# Patient Record
Sex: MALE | Race: WHITE | Employment: OTHER | URBAN - NONMETROPOLITAN AREA
[De-identification: names, ages, dates, MRNs, and addresses within clinical notes are randomized per-mention and may not be internally consistent; named-entity substitution may affect disease eponyms.]

---

## 2017-08-11 ENCOUNTER — INITIAL CONSULT (OUTPATIENT)
Dept: PULMONOLOGY | Age: 42
End: 2017-08-11
Payer: COMMERCIAL

## 2017-08-11 VITALS
WEIGHT: 178 LBS | HEIGHT: 70 IN | DIASTOLIC BLOOD PRESSURE: 70 MMHG | SYSTOLIC BLOOD PRESSURE: 120 MMHG | HEART RATE: 70 BPM | BODY MASS INDEX: 25.48 KG/M2

## 2017-08-11 VITALS
WEIGHT: 173 LBS | OXYGEN SATURATION: 98 % | BODY MASS INDEX: 24.77 KG/M2 | HEIGHT: 70 IN | DIASTOLIC BLOOD PRESSURE: 82 MMHG | SYSTOLIC BLOOD PRESSURE: 115 MMHG | HEART RATE: 60 BPM

## 2017-08-11 DIAGNOSIS — G47.00 DIFFICULTY STAYING ASLEEP: ICD-10-CM

## 2017-08-11 DIAGNOSIS — R06.81 WITNESSED APNEIC SPELLS: ICD-10-CM

## 2017-08-11 DIAGNOSIS — G47.10 HYPERSOMNIA: Primary | ICD-10-CM

## 2017-08-11 DIAGNOSIS — R06.83 SNORING: ICD-10-CM

## 2017-08-11 PROBLEM — R53.83 FATIGUE: Status: ACTIVE | Noted: 2017-08-11

## 2017-08-11 PROCEDURE — G8419 CALC BMI OUT NRM PARAM NOF/U: HCPCS | Performed by: INTERNAL MEDICINE

## 2017-08-11 PROCEDURE — 99203 OFFICE O/P NEW LOW 30 MIN: CPT | Performed by: INTERNAL MEDICINE

## 2017-08-11 PROCEDURE — 1036F TOBACCO NON-USER: CPT | Performed by: INTERNAL MEDICINE

## 2017-08-11 PROCEDURE — G8428 CUR MEDS NOT DOCUMENT: HCPCS | Performed by: INTERNAL MEDICINE

## 2017-08-15 ENCOUNTER — TELEPHONE (OUTPATIENT)
Dept: PULMONOLOGY | Age: 42
End: 2017-08-15

## 2017-08-16 ENCOUNTER — HOSPITAL ENCOUNTER (OUTPATIENT)
Dept: SLEEP CENTER | Age: 42
Discharge: HOME | End: 2017-08-16
Payer: COMMERCIAL

## 2017-08-16 DIAGNOSIS — G47.10 HYPERSOMNIA: ICD-10-CM

## 2017-08-16 DIAGNOSIS — R06.83 SNORING: ICD-10-CM

## 2017-08-16 PROCEDURE — 95810 POLYSOM 6/> YRS 4/> PARAM: CPT

## 2017-08-17 LAB — STATUS: NORMAL

## 2017-08-22 ENCOUNTER — TELEPHONE (OUTPATIENT)
Dept: SLEEP CENTER | Age: 42
End: 2017-08-22

## 2017-08-22 DIAGNOSIS — G47.33 OSA (OBSTRUCTIVE SLEEP APNEA): Primary | ICD-10-CM

## 2017-08-23 ENCOUNTER — HOSPITAL ENCOUNTER (OUTPATIENT)
Dept: SLEEP CENTER | Age: 42
Discharge: HOME | End: 2017-08-23
Payer: COMMERCIAL

## 2017-08-23 DIAGNOSIS — G47.33 OSA (OBSTRUCTIVE SLEEP APNEA): ICD-10-CM

## 2017-08-23 PROCEDURE — 95811 POLYSOM 6/>YRS CPAP 4/> PARM: CPT

## 2017-08-24 ENCOUNTER — TELEPHONE (OUTPATIENT)
Dept: SLEEP CENTER | Age: 42
End: 2017-08-24

## 2017-08-24 DIAGNOSIS — G47.33 OSA (OBSTRUCTIVE SLEEP APNEA): Primary | ICD-10-CM

## 2017-08-24 LAB — STATUS: NORMAL

## 2017-08-25 ENCOUNTER — TELEPHONE (OUTPATIENT)
Dept: SLEEP CENTER | Age: 42
End: 2017-08-25

## 2017-10-13 ENCOUNTER — OFFICE VISIT (OUTPATIENT)
Dept: PULMONOLOGY | Age: 42
End: 2017-10-13
Payer: COMMERCIAL

## 2017-10-13 VITALS
SYSTOLIC BLOOD PRESSURE: 118 MMHG | DIASTOLIC BLOOD PRESSURE: 80 MMHG | BODY MASS INDEX: 26.74 KG/M2 | HEART RATE: 69 BPM | WEIGHT: 186.8 LBS | OXYGEN SATURATION: 97 % | HEIGHT: 70 IN

## 2017-10-13 DIAGNOSIS — Z99.89 OSA ON CPAP: ICD-10-CM

## 2017-10-13 DIAGNOSIS — G47.33 OSA ON CPAP: ICD-10-CM

## 2017-10-13 PROBLEM — R06.83 SNORING: Status: RESOLVED | Noted: 2017-08-11 | Resolved: 2017-10-13

## 2017-10-13 PROBLEM — R06.81 WITNESSED APNEIC SPELLS: Status: RESOLVED | Noted: 2017-08-11 | Resolved: 2017-10-13

## 2017-10-13 PROBLEM — G47.00 DIFFICULTY STAYING ASLEEP: Status: RESOLVED | Noted: 2017-08-11 | Resolved: 2017-10-13

## 2017-10-13 PROCEDURE — G8484 FLU IMMUNIZE NO ADMIN: HCPCS | Performed by: PHYSICIAN ASSISTANT

## 2017-10-13 PROCEDURE — 1036F TOBACCO NON-USER: CPT | Performed by: PHYSICIAN ASSISTANT

## 2017-10-13 PROCEDURE — 99213 OFFICE O/P EST LOW 20 MIN: CPT | Performed by: PHYSICIAN ASSISTANT

## 2017-10-13 PROCEDURE — G8427 DOCREV CUR MEDS BY ELIG CLIN: HCPCS | Performed by: PHYSICIAN ASSISTANT

## 2017-10-13 PROCEDURE — G8419 CALC BMI OUT NRM PARAM NOF/U: HCPCS | Performed by: PHYSICIAN ASSISTANT

## 2017-10-13 NOTE — PROGRESS NOTES
mouth      NONFORMULARY Indications: Prostix      NONFORMULARY Indications: Alleplex 1-2      NONFORMULARY Indications: Juice PLus      NONFORMULARY Indications: Congaplex and Emphaplex as needed      Fexofenadine-Pseudoephedrine (ALLEGRA-D PO) Take  by mouth daily.  NONFORMULARY Indications: Cataplex G       No current facility-administered medications for this visit. Family History   Problem Relation Age of Onset    Other Father      BHARATI        Review of Systems -   General ROS: gained 8 lbs over 3 month  ENT ROS: negative for - nasal congestion, oral lesions or sore throat  Hematological and Lymphatic ROS: negative  Endocrine ROS: negative  Respiratory ROS: no cough, shortness of breath, or wheezing  Cardiovascular ROS: no chest pain or dyspnea on exertion  Gastrointestinal ROS: no abdominal pain, change in bowel habits, or black or bloody stools  Musculoskeletal ROS: negative  Neurological ROS: negative    Physical Exam:    BMI:  Body mass index is 26.8 kg/m². Wt Readings from Last 3 Encounters:   10/13/17 186 lb 12.8 oz (84.7 kg)   08/11/17 173 lb (78.5 kg)   08/11/17 178 lb (80.7 kg)     Vitals: /80   Pulse 69   Ht 5' 10\" (1.778 m)   Wt 186 lb 12.8 oz (84.7 kg)   SpO2 97% Comment: room air at rest  BMI 26.80 kg/m²       General appearance: alert and oriented to person, place and time, well-developed and well-nourished, in no acute distress  Nose: Nares normal. Septum midline. Mucosa normal. No drainage or sinus tenderness. Oropharynx:  negative  Lungs: clear to auscultation bilaterally  Heart: regular rate and rhythm, S1, S2 normal, no murmur, click, rub or gallop  Extremities: extremities normal, atraumatic, no cyanosis or edema  Neurologic: Mental status: Alert, oriented, thought content appropriate      ASSESSMENT/DIAGNOSIS    1. BHARATI on CPAP              Plan   Do you need any equipment today?  No    - He  was advised to continue current positive airway pressure therapy

## 2018-04-03 ENCOUNTER — OFFICE VISIT (OUTPATIENT)
Dept: PULMONOLOGY | Age: 43
End: 2018-04-03
Payer: COMMERCIAL

## 2018-04-03 VITALS
HEIGHT: 70 IN | HEART RATE: 66 BPM | DIASTOLIC BLOOD PRESSURE: 68 MMHG | WEIGHT: 179 LBS | BODY MASS INDEX: 25.62 KG/M2 | SYSTOLIC BLOOD PRESSURE: 114 MMHG | OXYGEN SATURATION: 98 %

## 2018-04-03 DIAGNOSIS — Z99.89 OSA ON CPAP: Primary | ICD-10-CM

## 2018-04-03 DIAGNOSIS — G47.33 OSA ON CPAP: Primary | ICD-10-CM

## 2018-04-03 PROCEDURE — G8419 CALC BMI OUT NRM PARAM NOF/U: HCPCS | Performed by: PHYSICIAN ASSISTANT

## 2018-04-03 PROCEDURE — G8427 DOCREV CUR MEDS BY ELIG CLIN: HCPCS | Performed by: PHYSICIAN ASSISTANT

## 2018-04-03 PROCEDURE — 1036F TOBACCO NON-USER: CPT | Performed by: PHYSICIAN ASSISTANT

## 2018-04-03 PROCEDURE — 99213 OFFICE O/P EST LOW 20 MIN: CPT | Performed by: PHYSICIAN ASSISTANT

## 2018-04-03 ASSESSMENT — ENCOUNTER SYMPTOMS
WHEEZING: 0
GASTROINTESTINAL NEGATIVE: 1
COUGH: 0
SINUS PAIN: 0
HEARTBURN: 0
NAUSEA: 0
EYES NEGATIVE: 1
SORE THROAT: 0
ORTHOPNEA: 0
RESPIRATORY NEGATIVE: 1
SHORTNESS OF BREATH: 0
SPUTUM PRODUCTION: 0

## 2018-05-17 ENCOUNTER — TELEPHONE (OUTPATIENT)
Dept: PULMONOLOGY | Age: 43
End: 2018-05-17

## 2018-05-17 DIAGNOSIS — G47.33 OSA ON CPAP: Primary | ICD-10-CM

## 2018-05-17 DIAGNOSIS — Z99.89 OSA ON CPAP: Primary | ICD-10-CM

## 2019-06-21 ENCOUNTER — OFFICE VISIT (OUTPATIENT)
Dept: PULMONOLOGY | Age: 44
End: 2019-06-21
Payer: COMMERCIAL

## 2019-06-21 VITALS
BODY MASS INDEX: 24.48 KG/M2 | SYSTOLIC BLOOD PRESSURE: 130 MMHG | HEIGHT: 70 IN | WEIGHT: 171 LBS | DIASTOLIC BLOOD PRESSURE: 68 MMHG | HEART RATE: 66 BPM | OXYGEN SATURATION: 98 %

## 2019-06-21 DIAGNOSIS — J30.2 SEASONAL ALLERGIES: ICD-10-CM

## 2019-06-21 DIAGNOSIS — Z99.89 OSA ON CPAP: Primary | ICD-10-CM

## 2019-06-21 DIAGNOSIS — G47.33 OSA ON CPAP: Primary | ICD-10-CM

## 2019-06-21 PROCEDURE — 99214 OFFICE O/P EST MOD 30 MIN: CPT | Performed by: PHYSICIAN ASSISTANT

## 2019-06-21 PROCEDURE — 1036F TOBACCO NON-USER: CPT | Performed by: PHYSICIAN ASSISTANT

## 2019-06-21 PROCEDURE — G8427 DOCREV CUR MEDS BY ELIG CLIN: HCPCS | Performed by: PHYSICIAN ASSISTANT

## 2019-06-21 PROCEDURE — G8420 CALC BMI NORM PARAMETERS: HCPCS | Performed by: PHYSICIAN ASSISTANT

## 2019-06-21 RX ORDER — MONTELUKAST SODIUM 10 MG/1
10 TABLET ORAL NIGHTLY
Qty: 30 TABLET | Refills: 5 | Status: SHIPPED | OUTPATIENT
Start: 2019-06-21 | End: 2020-07-15 | Stop reason: SDUPTHER

## 2019-06-21 ASSESSMENT — ENCOUNTER SYMPTOMS
WHEEZING: 0
DIARRHEA: 0
CHEST TIGHTNESS: 0
RHINORRHEA: 1
STRIDOR: 0
COUGH: 0
SHORTNESS OF BREATH: 0
NAUSEA: 0
ALLERGIC/IMMUNOLOGIC NEGATIVE: 1
EYES NEGATIVE: 1
BACK PAIN: 0

## 2019-06-21 NOTE — PROGRESS NOTES
[facility]      Marimar Ireland         [id number]  6/21/2019   Chief Complaint   Patient presents with    Follow-up     BHARATI 1 year follow up with a download         Pt of Dr. Roni NÚÑEZ Download:   Original or initialAHI: 7.8     Date of initial study: 8/16/17      Compliant  23%     Noncompliant 43 %     PAP Type AutoSet Level  5/15 cmH20    Avg Hrs/Day 3:32  AHI: 1.3   Recorded compliance dates , 05/20/19  to 06/18/19   Machine/Mfg: ResMed   Interface: FFM     Provider:    __[facility]           [facility]        __ [facility]    __ [address]            __[facility] __Adaptive   __[place]:       _X_Other: [place]     Neck Size: 15.25  Mallampati Mallampati 2  ESS:  6  SAQLI: 81       Here is a scan of the most recent download:          Presentation:   Inés Perry presents for sleep medicine follow up for obstructive sleep apnea  Since the last visit, Inés Perry has been struggling with PAP. His allergies are limiting his usage. He has been on Singulair in the past with good benefit but could not afford and stopped taking it. Equipment issues: The pressure is  acceptable, the mask is acceptable and he  is  using the humidity. Sleep issues:  Do you feel better? No  More rested? No   Better concentration? no    Progress History:   Since last visit any new medical issues? Allergies  New ER or hospitlal visits? No  Any new or changes in medicines? No  Any new sleep medicines?  No        Past Medical History:   Diagnosis Date    Kidney stone     Sleep apnea        Past Surgical History:   Procedure Laterality Date    CHOLECYSTECTOMY      2000    LITHOTRIPSY  2012    VASECTOMY  2003       Social History     Tobacco Use    Smoking status: Never Smoker    Smokeless tobacco: Never Used   Substance Use Topics    Alcohol use: No    Drug use: No       Allergies   Allergen Reactions    Floxin [Ofloxacin]     Ultram [Tramadol Hcl]        Current Outpatient Medications Medication Sig Dispense Refill    NONFORMULARY Indications: Shawn       NONFORMULARY Indications: Cataplex       montelukast (SINGULAIR) 10 MG tablet Take 1 tablet by mouth nightly 30 tablet 5    CPAP Machine MISC by Does not apply route Please change CPAP pressure to auto 5 -15 cm H20. 1 each 0    NONFORMULARY Indications: Alleplex 1-2      NONFORMULARY Indications: Juice PLus       No current facility-administered medications for this visit. Family History   Problem Relation Age of Onset    Other Father         BHARATI        Review of Systems -   Review of Systems   Constitutional: Negative for activity change, appetite change, chills and fever. HENT: Positive for congestion, postnasal drip and rhinorrhea. Eyes: Negative. Respiratory: Negative for cough, chest tightness, shortness of breath, wheezing and stridor. Cardiovascular: Negative for chest pain and leg swelling. Gastrointestinal: Negative for diarrhea and nausea. Endocrine: Negative. Genitourinary: Negative. Musculoskeletal: Negative. Negative for arthralgias and back pain. Skin: Negative. Allergic/Immunologic: Negative. Neurological: Negative. Negative for dizziness and light-headedness. Psychiatric/Behavioral: Negative. All other systems reviewed and are negative. Physical Exam:    BMI:  Body mass index is 24.54 kg/m². Wt Readings from Last 3 Encounters:   06/21/19 171 lb (77.6 kg)   04/03/18 179 lb (81.2 kg)   10/13/17 186 lb 12.8 oz (84.7 kg)     Weight stable / unchanged  Vitals: /68 (Site: Left Upper Arm, Position: Sitting, Cuff Size: Large Adult)   Pulse 66   Ht 5' 10\" (1.778 m)   Wt 171 lb (77.6 kg)   SpO2 98% Comment: on RA  BMI 24.54 kg/m²       Physical Exam   Constitutional: He is oriented to person, place, and time. He appears well-developed and well-nourished. HENT:   Head: Normocephalic and atraumatic.    Right Ear: External ear normal.   Left Ear: External ear normal. Mouth/Throat: Oropharynx is clear and moist.   Eyes: Pupils are equal, round, and reactive to light. Conjunctivae and EOM are normal.   Neck: Normal range of motion. Neck supple. Cardiovascular: Normal rate, regular rhythm and normal heart sounds. Pulmonary/Chest: Effort normal and breath sounds normal.   Abdominal: Soft. Musculoskeletal: Normal range of motion. Neurological: He is alert and oriented to person, place, and time. Skin: Skin is warm and dry. Psychiatric: He has a normal mood and affect. His behavior is normal. Judgment and thought content normal.         ASSESSMENT/DIAGNOSIS     Diagnosis Orders   1. BHARATI on CPAP     2. Seasonal allergies              Plan   Do you need any equipment today? No  - will restart Singulair and use Good Rx card for savings  - Once allergies improved will start back on PAP  - He  was advised to continue current positive airway pressure therapy with above described pressure. - He  advised to keep goodcompliance with current recommended pressure to get optimal results and clinical improvement  - Recommend 7-9 hours of sleep with PAP  - He was advised to call [employer] regarding supplies if needed.   -He call my office for earlier appointment if needed for worsening of sleep symptoms.   - He was instructed on weight loss  - Herbert Kurtz was educated about my impression and plan. Patient verbalizesunderstanding.   We will see Antonio Werner back in: 3 months with download    Information added by my medical assistant/LPN was reviewed today         Josephine Congress JENNIFER, ANA PAULA  6/21/2019

## 2019-08-02 ENCOUNTER — OFFICE VISIT (OUTPATIENT)
Dept: PULMONOLOGY | Age: 44
End: 2019-08-02
Payer: COMMERCIAL

## 2019-08-02 VITALS
HEART RATE: 71 BPM | DIASTOLIC BLOOD PRESSURE: 78 MMHG | OXYGEN SATURATION: 99 % | SYSTOLIC BLOOD PRESSURE: 110 MMHG | RESPIRATION RATE: 16 BRPM | BODY MASS INDEX: 24.05 KG/M2 | HEIGHT: 70 IN | WEIGHT: 168 LBS

## 2019-08-02 DIAGNOSIS — J30.2 SEASONAL ALLERGIES: ICD-10-CM

## 2019-08-02 DIAGNOSIS — G47.33 OSA ON CPAP: Primary | ICD-10-CM

## 2019-08-02 DIAGNOSIS — Z99.89 OSA ON CPAP: Primary | ICD-10-CM

## 2019-08-02 PROCEDURE — G8427 DOCREV CUR MEDS BY ELIG CLIN: HCPCS | Performed by: PHYSICIAN ASSISTANT

## 2019-08-02 PROCEDURE — G8420 CALC BMI NORM PARAMETERS: HCPCS | Performed by: PHYSICIAN ASSISTANT

## 2019-08-02 PROCEDURE — 99213 OFFICE O/P EST LOW 20 MIN: CPT | Performed by: PHYSICIAN ASSISTANT

## 2019-08-02 PROCEDURE — 1036F TOBACCO NON-USER: CPT | Performed by: PHYSICIAN ASSISTANT

## 2019-08-02 ASSESSMENT — ENCOUNTER SYMPTOMS
CHEST TIGHTNESS: 0
SHORTNESS OF BREATH: 0
COUGH: 0
ALLERGIC/IMMUNOLOGIC NEGATIVE: 1
BACK PAIN: 0
NAUSEA: 0
DIARRHEA: 0
STRIDOR: 0
EYES NEGATIVE: 1
WHEEZING: 0

## 2019-08-02 NOTE — PROGRESS NOTES
NONFORMULARY Indications: Shawn       NONFORMULARY Indications: Cataplex       montelukast (SINGULAIR) 10 MG tablet Take 1 tablet by mouth nightly 30 tablet 5    CPAP Machine MISC by Does not apply route Please change CPAP pressure to auto 5 -15 cm H20. 1 each 0    NONFORMULARY Indications: Alleplex 1-2      NONFORMULARY Indications: Juice PLus       No current facility-administered medications for this visit. Family History   Problem Relation Age of Onset    Other Father         BHARATI        Review of Systems -   Review of Systems   Constitutional: Negative for activity change, appetite change, chills and fever. HENT: Negative for congestion and postnasal drip. Eyes: Negative. Respiratory: Negative for cough, chest tightness, shortness of breath, wheezing and stridor. Cardiovascular: Negative for chest pain and leg swelling. Gastrointestinal: Negative for diarrhea and nausea. Endocrine: Negative. Genitourinary: Negative. Musculoskeletal: Negative. Negative for arthralgias and back pain. Skin: Negative. Allergic/Immunologic: Negative. Neurological: Negative. Negative for dizziness and light-headedness. Psychiatric/Behavioral: Negative. All other systems reviewed and are negative. Physical Exam:    BMI:  Body mass index is 24.11 kg/m². Wt Readings from Last 3 Encounters:   08/02/19 168 lb (76.2 kg)   06/21/19 171 lb (77.6 kg)   04/03/18 179 lb (81.2 kg)     Weight lost 3 lbs over 2 months  Vitals: /78 (Site: Left Upper Arm, Position: Sitting, Cuff Size: Large Adult)   Pulse 71   Resp 16   Ht 5' 10\" (1.778 m)   Wt 168 lb (76.2 kg)   SpO2 99% Comment: on RA  BMI 24.11 kg/m²       Physical Exam   Constitutional: He is oriented to person, place, and time. He appears well-developed and well-nourished. HENT:   Head: Normocephalic and atraumatic.    Right Ear: External ear normal.   Left Ear: External ear normal.   Mouth/Throat: Oropharynx is clear and

## 2020-05-07 ENCOUNTER — HOSPITAL ENCOUNTER (EMERGENCY)
Age: 45
Discharge: HOME | End: 2020-05-07
Payer: COMMERCIAL

## 2020-05-07 VITALS
HEIGHT: 70 IN | BODY MASS INDEX: 25.77 KG/M2 | HEART RATE: 69 BPM | TEMPERATURE: 97.9 F | RESPIRATION RATE: 16 BRPM | OXYGEN SATURATION: 97 % | SYSTOLIC BLOOD PRESSURE: 125 MMHG | WEIGHT: 180 LBS | DIASTOLIC BLOOD PRESSURE: 76 MMHG

## 2020-05-07 PROCEDURE — 6360000002 HC RX W HCPCS: Performed by: NURSE PRACTITIONER

## 2020-05-07 PROCEDURE — 90715 TDAP VACCINE 7 YRS/> IM: CPT | Performed by: NURSE PRACTITIONER

## 2020-05-07 PROCEDURE — 99202 OFFICE O/P NEW SF 15 MIN: CPT | Performed by: NURSE PRACTITIONER

## 2020-05-07 PROCEDURE — 12001 RPR S/N/AX/GEN/TRNK 2.5CM/<: CPT | Performed by: NURSE PRACTITIONER

## 2020-05-07 PROCEDURE — 90471 IMMUNIZATION ADMIN: CPT | Performed by: NURSE PRACTITIONER

## 2020-05-07 PROCEDURE — 12001 RPR S/N/AX/GEN/TRNK 2.5CM/<: CPT

## 2020-05-07 PROCEDURE — 2500000003 HC RX 250 WO HCPCS

## 2020-05-07 PROCEDURE — 99212 OFFICE O/P EST SF 10 MIN: CPT

## 2020-05-07 PROCEDURE — 6370000000 HC RX 637 (ALT 250 FOR IP): Performed by: NURSE PRACTITIONER

## 2020-05-07 RX ORDER — LIDOCAINE HYDROCHLORIDE 10 MG/ML
INJECTION, SOLUTION EPIDURAL; INFILTRATION; INTRACAUDAL; PERINEURAL
Status: COMPLETED
Start: 2020-05-07 | End: 2020-05-07

## 2020-05-07 RX ORDER — ACETAMINOPHEN 650 MG
TABLET, EXTENDED RELEASE ORAL ONCE
Status: COMPLETED | OUTPATIENT
Start: 2020-05-07 | End: 2020-05-07

## 2020-05-07 RX ORDER — DIAPER,BRIEF,INFANT-TODD,DISP
EACH MISCELLANEOUS ONCE
Status: COMPLETED | OUTPATIENT
Start: 2020-05-07 | End: 2020-05-07

## 2020-05-07 RX ADMIN — LIDOCAINE HYDROCHLORIDE: 10 INJECTION, SOLUTION EPIDURAL; INFILTRATION; INTRACAUDAL; PERINEURAL at 11:57

## 2020-05-07 RX ADMIN — BACITRACIN ZINC: 500 OINTMENT TOPICAL at 11:57

## 2020-05-07 RX ADMIN — TETANUS TOXOID, REDUCED DIPHTHERIA TOXOID AND ACELLULAR PERTUSSIS VACCINE, ADSORBED 0.5 ML: 5; 2.5; 8; 8; 2.5 SUSPENSION INTRAMUSCULAR at 11:12

## 2020-05-07 RX ADMIN — Medication: at 11:13

## 2020-05-07 ASSESSMENT — ENCOUNTER SYMPTOMS
NAUSEA: 0
SHORTNESS OF BREATH: 0
VOMITING: 0
TROUBLE SWALLOWING: 0

## 2020-05-07 ASSESSMENT — PAIN DESCRIPTION - LOCATION: LOCATION: FINGER (COMMENT WHICH ONE)

## 2020-05-07 ASSESSMENT — PAIN DESCRIPTION - DESCRIPTORS: DESCRIPTORS: THROBBING

## 2020-05-07 ASSESSMENT — PAIN DESCRIPTION - FREQUENCY: FREQUENCY: CONTINUOUS

## 2020-05-07 ASSESSMENT — PAIN SCALES - GENERAL: PAINLEVEL_OUTOF10: 10

## 2020-05-07 ASSESSMENT — PAIN DESCRIPTION - PAIN TYPE: TYPE: ACUTE PAIN

## 2020-05-07 ASSESSMENT — PAIN DESCRIPTION - ORIENTATION: ORIENTATION: RIGHT

## 2020-05-07 NOTE — ED TRIAGE NOTES
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 Pt states pain 10. 1 cm laceration noted no active bleeding at medial side of distal knuckle.

## 2020-07-15 RX ORDER — MONTELUKAST SODIUM 10 MG/1
10 TABLET ORAL NIGHTLY
Qty: 30 TABLET | Refills: 5 | Status: SHIPPED | OUTPATIENT
Start: 2020-07-15 | End: 2022-04-01 | Stop reason: SDUPTHER

## 2020-07-15 NOTE — TELEPHONE ENCOUNTER
Lito Ferguson called requesting a refill on the following medications:  Requested Prescriptions     Pending Prescriptions Disp Refills    montelukast (SINGULAIR) 10 MG tablet 30 tablet 5     Sig: Take 1 tablet by mouth nightly     Pharmacy verified:[facility]    . pv      Date of last visit: 08/02/19  Date of next visit (if applicable): Visit date not found

## 2020-08-07 ENCOUNTER — OFFICE VISIT (OUTPATIENT)
Dept: PULMONOLOGY | Age: 45
End: 2020-08-07
Payer: COMMERCIAL

## 2020-08-07 VITALS
OXYGEN SATURATION: 98 % | SYSTOLIC BLOOD PRESSURE: 124 MMHG | HEIGHT: 70 IN | WEIGHT: 181 LBS | DIASTOLIC BLOOD PRESSURE: 78 MMHG | HEART RATE: 70 BPM | TEMPERATURE: 97.5 F | BODY MASS INDEX: 25.91 KG/M2

## 2020-08-07 PROCEDURE — 99214 OFFICE O/P EST MOD 30 MIN: CPT | Performed by: PHYSICIAN ASSISTANT

## 2020-08-07 ASSESSMENT — ENCOUNTER SYMPTOMS
NAUSEA: 0
WHEEZING: 0
EYES NEGATIVE: 1
ALLERGIC/IMMUNOLOGIC NEGATIVE: 1
STRIDOR: 0
CHEST TIGHTNESS: 0
DIARRHEA: 0
COUGH: 0
SHORTNESS OF BREATH: 0
BACK PAIN: 0

## 2020-08-07 NOTE — PROGRESS NOTES
[facility]      Chace Love         [id number]  8/7/2020   Chief Complaint   Patient presents with    Follow-up     BHARATI 1 year follow up with a download        Pt of Dr. Jamel Christina    PAP Download:   Original or initial AHI: 7.8     Date of initial study: 8/16/17      Compliant  77%     Noncompliant 10 %     PAP Type Auto Level  5/15 cmH20   Avg Hrs/Day 6:16  AHI: 1.0   Recorded compliance dates , 7/7/20  to 8/5/20   Machine/Mfg:   [x] ResMed    [] Respironics/Dreamstation   Interface:   [] Nasal    [] Nasal pillows   [x] FFM      Provider:      [] -CAYETANO     []Milla     [] Sapna    [] Michelle Dewitt    [x] Santa               [] [facility]      [] [facility]    [] [address]      [] Other    Neck Size: 15.25  Mallampati Mallampati 2  ESS:  7  SAQLI:     Here is a scan of the most recent download:          Presentation:   Pallavi Preston presents for sleep medicine follow up for obstructive sleep apnea  Since the last visit, Pallavi Preston is doing well with PAP. He still feels tired. He has noticed this worse the past few months. He wakes 1-2 times a nights and will fall back asleep. He has been following with a chiropractor and is on supplements and was started on supplement for fatigue. Equipment issues: The pressure is  acceptable, the mask is acceptable     Sleep issues:  Do you feel better? Yes and No  More rested? Sometimes   Better concentration? yes    Progress History:   Since last visit any new medical issues? No  New ER or hospitlal visits? No  Any new or changes in medicines? No  Any new sleep medicines? No        Past Medical History:   Diagnosis Date    Kidney stone     Sleep apnea        Past Surgical History:   Procedure Laterality Date    CHOLECYSTECTOMY      2000    LITHOTRIPSY  2012    VASECTOMY  2003       Social History     Tobacco Use    Smoking status: Never Smoker    Smokeless tobacco: Never Used   Substance Use Topics    Alcohol use:  No  Drug use: No       Allergies   Allergen Reactions    Floxin [Ofloxacin]     Ultram [Tramadol Hcl]        Current Outpatient Medications   Medication Sig Dispense Refill    montelukast (SINGULAIR) 10 MG tablet Take 1 tablet by mouth nightly 30 tablet 5    CPAP Machine MISC by Does not apply route Please change CPAP pressure to auto 5 -15 cm H20. 1 each 0    NONFORMULARY Indications: Shawn       NONFORMULARY Indications: Cataplex       NONFORMULARY Indications: Alleplex 1-2      NONFORMULARY Indications: Juice PLus       No current facility-administered medications for this visit. Family History   Problem Relation Age of Onset    Other Father         BHARATI        Review of Systems -   Review of Systems   Constitutional: Negative for activity change, appetite change, chills and fever. HENT: Negative for congestion and postnasal drip. Eyes: Negative. Respiratory: Negative for cough, chest tightness, shortness of breath, wheezing and stridor. Cardiovascular: Negative for chest pain and leg swelling. Gastrointestinal: Negative for diarrhea and nausea. Endocrine: Negative. Genitourinary: Negative. Musculoskeletal: Negative. Negative for arthralgias and back pain. Skin: Negative. Allergic/Immunologic: Negative. Neurological: Negative. Negative for dizziness and light-headedness. Psychiatric/Behavioral: Negative. All other systems reviewed and are negative. Physical Exam:    BMI:  Body mass index is 25.97 kg/m².     Wt Readings from Last 3 Encounters:   08/07/20 181 lb (82.1 kg)   05/07/20 180 lb (81.6 kg)   08/02/19 168 lb (76.2 kg)     Weight gained 13 lbs over 1 year  Vitals: /78 (Site: Left Upper Arm, Position: Sitting, Cuff Size: Medium Adult)   Pulse 70   Temp 97.5 °F (36.4 °C) (Tympanic)   Ht 5' 10\" (1.778 m)   Wt 181 lb (82.1 kg)   SpO2 98% Comment: on room air at rest  BMI 25.97 kg/m²       Physical Exam  Constitutional:       Appearance: He is well-developed. HENT:      Head: Normocephalic and atraumatic. Right Ear: External ear normal.      Left Ear: External ear normal.   Eyes:      Conjunctiva/sclera: Conjunctivae normal.      Pupils: Pupils are equal, round, and reactive to light. Neck:      Musculoskeletal: Normal range of motion and neck supple. Cardiovascular:      Rate and Rhythm: Normal rate and regular rhythm. Heart sounds: Normal heart sounds. Pulmonary:      Effort: Pulmonary effort is normal.      Breath sounds: Normal breath sounds. Musculoskeletal: Normal range of motion. Skin:     General: Skin is warm and dry. Neurological:      Mental Status: He is alert and oriented to person, place, and time. Psychiatric:         Behavior: Behavior normal.         Thought Content: Thought content normal.         Judgment: Judgment normal.           ASSESSMENT/DIAGNOSIS     Diagnosis Orders   1. BHARATI on CPAP     2. Hypersomnia              Plan   Do you need any equipment today? Yes update supplies  - Will change pressure to 10-20 to see if improves him taking his PAP off. - will wait and see if supplement improves hypersomnia  - He  was advised to continue current positive airway pressure therapy with above described pressure. - He  advised to keep good compliance with current recommended pressure to get optimal results and clinical improvement  - Recommend 7-9 hours of sleep with PAP  - He was advised to call [employer] regarding supplies if needed.   -He call my office for earlier appointment if needed for worsening of sleep symptoms.   - He was instructed on weight loss  - Media Ohms was educated about my impression and plan. Patient verbalizesunderstanding.   We will see Vanessa Ulises back in: 3 months with download    Information added by my medical assistant/LPN was reviewed today         Jeff De Los Santos PA-C, MPAS  8/7/2020

## 2020-08-27 ENCOUNTER — TELEPHONE (OUTPATIENT)
Dept: PULMONOLOGY | Age: 45
End: 2020-08-27

## 2020-08-27 NOTE — TELEPHONE ENCOUNTER
Patient phoned in and reported at his last visit he had pressure change, and reported that he is sleeping better and feeling better. Patient reports though that his wife told him he is constantly tossing and turning and kicking legs through night. Patient questioning if you have any suggestions for him. Please advise.

## 2020-12-04 ENCOUNTER — OFFICE VISIT (OUTPATIENT)
Dept: PULMONOLOGY | Age: 45
End: 2020-12-04
Payer: COMMERCIAL

## 2020-12-04 VITALS
SYSTOLIC BLOOD PRESSURE: 120 MMHG | DIASTOLIC BLOOD PRESSURE: 68 MMHG | BODY MASS INDEX: 25.86 KG/M2 | HEIGHT: 70 IN | HEART RATE: 69 BPM | WEIGHT: 180.6 LBS | OXYGEN SATURATION: 98 %

## 2020-12-04 PROCEDURE — 99213 OFFICE O/P EST LOW 20 MIN: CPT | Performed by: PHYSICIAN ASSISTANT

## 2020-12-04 ASSESSMENT — ENCOUNTER SYMPTOMS
EYES NEGATIVE: 1
COUGH: 0
NAUSEA: 0
SHORTNESS OF BREATH: 0
DIARRHEA: 0
STRIDOR: 0
WHEEZING: 0
ALLERGIC/IMMUNOLOGIC NEGATIVE: 1
BACK PAIN: 0
CHEST TIGHTNESS: 0

## 2020-12-04 NOTE — PROGRESS NOTES
Allergies   Allergen Reactions    Floxin [Ofloxacin]     Ultram [Tramadol Hcl]        Current Outpatient Medications   Medication Sig Dispense Refill    CPAP Machine MISC by Does not apply route Please change CPAP pressure to auto 10-20 cm H20. 1 each 0    montelukast (SINGULAIR) 10 MG tablet Take 1 tablet by mouth nightly 30 tablet 5    NONFORMULARY Indications: Shawn       NONFORMULARY Indications: Cataplex       NONFORMULARY Indications: Alleplex 1-2      NONFORMULARY Indications: Juice PLus       No current facility-administered medications for this visit. Family History   Problem Relation Age of Onset    Other Father         BHARATI        Review of Systems -   Review of Systems   Constitutional: Negative for activity change, appetite change, chills and fever. HENT: Negative for congestion and postnasal drip. Eyes: Negative. Respiratory: Negative for cough, chest tightness, shortness of breath, wheezing and stridor. Cardiovascular: Negative for chest pain and leg swelling. Gastrointestinal: Negative for diarrhea and nausea. Bloating   Endocrine: Negative. Genitourinary: Negative. Musculoskeletal: Negative. Negative for arthralgias and back pain. Skin: Negative. Allergic/Immunologic: Negative. Neurological: Negative. Negative for dizziness and light-headedness. Psychiatric/Behavioral: Negative. All other systems reviewed and are negative. Physical Exam:    BMI:  Body mass index is 25.91 kg/m². Wt Readings from Last 3 Encounters:   12/04/20 180 lb 9.6 oz (81.9 kg)   08/07/20 181 lb (82.1 kg)   05/07/20 180 lb (81.6 kg)     Weight stable / unchanged  Vitals: /68 (Site: Left Upper Arm, Position: Sitting, Cuff Size: Large Adult)   Pulse 69   Ht 5' 10\" (1.778 m)   Wt 180 lb 9.6 oz (81.9 kg)   SpO2 98% Comment: on room air at rest  BMI 25.91 kg/m²       Physical Exam  Constitutional:       Appearance: He is well-developed.    HENT: Head: Normocephalic and atraumatic. Right Ear: External ear normal.      Left Ear: External ear normal.   Eyes:      Conjunctiva/sclera: Conjunctivae normal.      Pupils: Pupils are equal, round, and reactive to light. Neck:      Musculoskeletal: Normal range of motion and neck supple. Cardiovascular:      Rate and Rhythm: Normal rate and regular rhythm. Heart sounds: Normal heart sounds. Pulmonary:      Effort: Pulmonary effort is normal.      Breath sounds: Normal breath sounds. Musculoskeletal: Normal range of motion. Skin:     General: Skin is warm and dry. Neurological:      Mental Status: He is alert and oriented to person, place, and time. Psychiatric:         Behavior: Behavior normal.         Thought Content: Thought content normal.         Judgment: Judgment normal.           ASSESSMENT/DIAGNOSIS     Diagnosis Orders   1. BHARATI on CPAP     2. Hypersomnia              Plan   Do you need any equipment today? No  - Will add EPR and set pressure to improve bloating  - Download reviewed and discussed with patient  - He  was advised to continue current positive airway pressure therapy with above described pressure. - He  advised to keep good compliance with current recommended pressure to get optimal results and clinical improvement  - Recommend 7-9 hours of sleep with PAP  - He was advised to call [employer] regarding supplies if needed.   -He call my office for earlier appointment if needed for worsening of sleep symptoms.   - He was instructed on weight loss  - Barbara Rdz was educated about my impression and plan. Patient verbalizesunderstanding.   We will see Sanjana Levy back in: 1 year with download    Information added by my medical assistant/LPN was reviewed today          Hadley Mccord PA-C, MPAS  12/4/2020

## 2021-11-16 ENCOUNTER — HOSPITAL ENCOUNTER (EMERGENCY)
Age: 46
Discharge: HOME | End: 2021-11-16
Payer: COMMERCIAL

## 2021-11-16 ENCOUNTER — APPOINTMENT (OUTPATIENT)
Dept: GENERAL RADIOLOGY | Age: 46
End: 2021-11-16
Payer: COMMERCIAL

## 2021-11-16 VITALS
RESPIRATION RATE: 20 BRPM | SYSTOLIC BLOOD PRESSURE: 130 MMHG | BODY MASS INDEX: 25.77 KG/M2 | HEART RATE: 88 BPM | TEMPERATURE: 97.4 F | OXYGEN SATURATION: 98 % | WEIGHT: 180 LBS | HEIGHT: 70 IN | DIASTOLIC BLOOD PRESSURE: 89 MMHG

## 2021-11-16 DIAGNOSIS — J06.9 UPPER RESPIRATORY TRACT INFECTION, UNSPECIFIED TYPE: Primary | ICD-10-CM

## 2021-11-16 DIAGNOSIS — Z20.822 ENCOUNTER FOR LABORATORY TESTING FOR COVID-19 VIRUS: ICD-10-CM

## 2021-11-16 PROCEDURE — 71046 X-RAY EXAM CHEST 2 VIEWS: CPT

## 2021-11-16 PROCEDURE — 99213 OFFICE O/P EST LOW 20 MIN: CPT | Performed by: NURSE PRACTITIONER

## 2021-11-16 PROCEDURE — 87636 SARSCOV2 & INF A&B AMP PRB: CPT

## 2021-11-16 PROCEDURE — 99213 OFFICE O/P EST LOW 20 MIN: CPT

## 2021-11-16 RX ORDER — ALBUTEROL SULFATE 90 UG/1
2 AEROSOL, METERED RESPIRATORY (INHALATION) EVERY 4 HOURS PRN
Qty: 18 G | Refills: 0 | Status: SHIPPED | OUTPATIENT
Start: 2021-11-16 | End: 2022-08-01

## 2021-11-16 RX ORDER — ACETAMINOPHEN 500 MG
500 TABLET ORAL EVERY 6 HOURS PRN
COMMUNITY

## 2021-11-16 RX ORDER — AZITHROMYCIN 250 MG/1
TABLET, FILM COATED ORAL
Qty: 1 PACKET | Refills: 0 | Status: SHIPPED | OUTPATIENT
Start: 2021-11-16 | End: 2022-08-01

## 2021-11-16 RX ORDER — DEXTROMETHORPHAN HYDROBROMIDE AND PROMETHAZINE HYDROCHLORIDE 15; 6.25 MG/5ML; MG/5ML
5 SYRUP ORAL 4 TIMES DAILY PRN
Qty: 118 ML | Refills: 0 | Status: SHIPPED | OUTPATIENT
Start: 2021-11-16 | End: 2021-11-23

## 2021-11-16 RX ORDER — BENZONATATE 200 MG/1
200 CAPSULE ORAL 3 TIMES DAILY PRN
Qty: 21 CAPSULE | Refills: 0 | Status: SHIPPED | OUTPATIENT
Start: 2021-11-16 | End: 2021-11-23

## 2021-11-16 RX ORDER — DEXAMETHASONE 6 MG/1
6 TABLET ORAL 2 TIMES DAILY WITH MEALS
Qty: 14 TABLET | Refills: 0 | Status: SHIPPED | OUTPATIENT
Start: 2021-11-16 | End: 2021-11-23

## 2021-11-16 ASSESSMENT — ENCOUNTER SYMPTOMS
NAUSEA: 0
COUGH: 1
CHEST TIGHTNESS: 1
DIARRHEA: 0
SHORTNESS OF BREATH: 0
SORE THROAT: 1
VOMITING: 0
SINUS PRESSURE: 1

## 2021-11-16 ASSESSMENT — PAIN DESCRIPTION - PAIN TYPE: TYPE: ACUTE PAIN

## 2021-11-16 ASSESSMENT — PAIN SCALES - GENERAL: PAINLEVEL_OUTOF10: 6

## 2021-11-16 ASSESSMENT — PAIN DESCRIPTION - FREQUENCY: FREQUENCY: CONTINUOUS

## 2021-11-16 ASSESSMENT — PAIN DESCRIPTION - LOCATION: LOCATION: HEAD

## 2021-11-16 ASSESSMENT — PAIN DESCRIPTION - PROGRESSION: CLINICAL_PROGRESSION: GRADUALLY WORSENING

## 2021-11-16 ASSESSMENT — PAIN DESCRIPTION - ONSET: ONSET: ON-GOING

## 2021-11-16 ASSESSMENT — PAIN DESCRIPTION - DESCRIPTORS: DESCRIPTORS: ACHING

## 2021-11-16 NOTE — ED PROVIDER NOTES
[address]  Urgent Care Encounter       CHIEF COMPLAINT       Chief Complaint   Patient presents with    Chest Congestion    Cough    Headache       Nurses Notes reviewed and I agree except as noted in the HPI. HISTORY OF PRESENT ILLNESS   Gissell Covington is a 39 y.o. male who presents for evaluation of cough, congestion, headaches, body aches, chills and mild chest tightness with fatigue that have been ongoing for the past 3 days. Patient states that the symptoms do seem to be worsening. He states that he took an at home Covid test from [employer] [facility] which was negative. Patient states that he has been taking Mucinex DM at home which provides minimal relief. The history is provided by the patient. REVIEW OF SYSTEMS     Review of Systems   Constitutional: Positive for chills. Negative for fever. HENT: Positive for congestion, postnasal drip, sinus pressure and sore throat. Respiratory: Positive for cough and chest tightness. Negative for shortness of breath. Cardiovascular: Negative for chest pain. Gastrointestinal: Negative for diarrhea, nausea and vomiting. Musculoskeletal: Positive for myalgias. Negative for arthralgias. Skin: Negative for rash. Allergic/Immunologic: Negative for immunocompromised state. Neurological: Positive for headaches. PAST MEDICAL HISTORY         Diagnosis Date    Kidney stone     Sleep apnea        SURGICALHISTORY     Patient  has a past surgical history that includes Lithotripsy (2012); Cholecystectomy; and Vasectomy (2003). CURRENT MEDICATIONS       Previous Medications    ACETAMINOPHEN (TYLENOL) 500 MG TABLET    Take 500 mg by mouth every 6 hours as needed for Pain    CPAP MACHINE MISC    by Does not apply route Please change CPAP pressure to 11 cm H20.   Add EPR 3    MONTELUKAST (SINGULAIR) 10 MG TABLET    Take 1 tablet by mouth nightly    NONFORMULARY    Indications: Alleplex 1-2    NONFORMULARY    Indications: Juice PLus       ALLERGIES     Patient is is allergic to floxin [ofloxacin] and ultram [tramadol hcl]. Patients   Immunization History   Administered Date(s) Administered    Tdap (Boostrix, Adacel) 05/07/2020       FAMILY HISTORY     Patient's family history includes Other in his father. SOCIAL HISTORY     Patient  reports that he has never smoked. He has never used smokeless tobacco. He reports that he does not drink alcohol and does not use drugs. PHYSICAL EXAM     ED TRIAGE VITALS  BP: 130/89, Temp: 97.4 °F (36.3 °C), Pulse: 88, Resp: 20, SpO2: 98 %,Estimated body mass index is 25.83 kg/m² as calculated from the following:    Height as of this encounter: 5' 10\" (1.778 m). Weight as of this encounter: 180 lb (81.6 kg). ,No LMP for male patient. Physical Exam  Vitals and nursing note reviewed. Constitutional:       General: He is not in acute distress. Appearance: He is well-developed. He is not diaphoretic. HENT:      Right Ear: Tympanic membrane and ear canal normal.      Left Ear: Tympanic membrane and ear canal normal.      Mouth/Throat:      Mouth: Mucous membranes are moist.      Pharynx: Posterior oropharyngeal erythema present. Tonsils: No tonsillar exudate. 1+ on the right. 1+ on the left. Eyes:      Conjunctiva/sclera:      Right eye: Right conjunctiva is not injected. Left eye: Left conjunctiva is not injected. Pupils: Pupils are equal.   Cardiovascular:      Rate and Rhythm: Normal rate and regular rhythm. Heart sounds: No murmur heard. Pulmonary:      Effort: Pulmonary effort is normal. No respiratory distress. Breath sounds: Examination of the right-lower field reveals rhonchi. Examination of the left-lower field reveals rhonchi. Rhonchi present. Musculoskeletal:      Cervical back: Normal range of motion. Right knee: Normal range of motion. Left knee: Normal range of motion.    Lymphadenopathy:      Head:      Right side of head: No tonsillar adenopathy. Left side of head: No tonsillar adenopathy. Cervical: No cervical adenopathy. Skin:     General: Skin is warm. Findings: No rash. Neurological:      Mental Status: He is alert and oriented to person, place, and time. Psychiatric:         Behavior: Behavior normal.         DIAGNOSTIC RESULTS     Labs:No results found for this visit on 11/16/21. IMAGING:    XR CHEST (2 VW)   Final Result   1. Normal heart size. 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 2. No acute findings. No infiltrates or effusions are seen. **This report has been created using voice recognition software. It may contain minor errors which are inherent in voice recognition technology. **      Final report electronically signed by Dr. Scott Victor on 11/16/2021 6:31 PM            EKG:      URGENT CARE COURSE:     Vitals:    11/16/21 1750   BP: 130/89   Pulse: 88   Resp: 20   Temp: 97.4 °F (36.3 °C)   TempSrc: Temporal   SpO2: 98%   Weight: 180 lb (81.6 kg)   Height: 5' 10\" (1.778 m)       Medications - No data to display         PROCEDURES:  None    FINAL IMPRESSION      1. Upper respiratory tract infection, unspecified type    2. Encounter for laboratory testing for COVID-19 virus          DISPOSITION/ PLAN     Chest x-ray is reassuring per the read of the radiologist.  I discussed with the patient that although he had a negative Covid test at home, his symptoms are concerning for coronavirus. He is agreeable to a PCR send out test.  Patient is treated with oral steroids, and albuterol inhaler, cough medications and a Z-Jose E and is advised on over-the-counter vitamin supplements to take in the event that he would be positive for Covid. He is instructed to contact his PCP if he is positive as he states that he is interested in the possibility of monoclonal antibody therapy. Patient is advised to rest and hydrate at home but to also remain active and keep his lungs open. He is advised to present to the ER if his symptoms worsen and he is agreeable to plan as discussed. PATIENT REFERRED TO:  Enrique Mancuso MD  [address] / [facility] [address]      DISCHARGE MEDICATIONS:  New Prescriptions    ALBUTEROL SULFATE  (90 BASE) MCG/ACT INHALER    Inhale 2 puffs into the lungs every 4 hours as needed for Wheezing or Shortness of Breath    AZITHROMYCIN (ZITHROMAX Z-PETER) 250 MG TABLET    Take 2 tablets (500 mg) on Day 1, and then take 1 tablet (250 mg) on days 2 through 5. BENZONATATE (TESSALON) 200 MG CAPSULE    Take 1 capsule by mouth 3 times daily as needed for Cough    DEXAMETHASONE (DECADRON) 6 MG TABLET    Take 1 tablet by mouth 2 times daily (with meals) for 7 days    PROMETHAZINE-DEXTROMETHORPHAN (PROMETHAZINE-DM) 6.25-15 MG/5ML SYRUP    Take 5 mLs by mouth 4 times daily as needed for Cough Do not drive or operate heavy machinery while taking this medication.        Discontinued Medications    NONFORMULARY    Indications: Shawn     NONFORMULARY    Indications: Cataplex        Current Discharge Medication List          MERLYN Cope CNP    (Please note that portions of this note were completed with a voice recognition program. Efforts were made to edit the dictations but occasionally words are mis-transcribed.)          MERLYN Cope CNP  11/16/21 6845

## 2021-11-16 NOTE — ED TRIAGE NOTES
Pt to urgent care due to a cough, fatigue, headache and chest congestion. Pt did get a covid test from [employer] and the results were negative. He thinks he has pneumonia, which he has a history of. New onset of symptoms started on Sunday.

## 2021-11-17 LAB
INFLUENZA A: NOT DETECTED
INFLUENZA B: NOT DETECTED
SARS-COV-2 RNA, RT PCR: NOT DETECTED

## 2021-11-17 NOTE — ED NOTES
Discharge instructions and prescriptions reviewed with pt. Pt verbalized understanding. Pt ambulated out in stable condition. Assessment unchanged upon discharge.      Princess Knowleselor, RN  11/16/21 4779

## 2022-04-01 ENCOUNTER — OFFICE VISIT (OUTPATIENT)
Dept: PULMONOLOGY | Age: 47
End: 2022-04-01
Payer: COMMERCIAL

## 2022-04-01 VITALS
DIASTOLIC BLOOD PRESSURE: 84 MMHG | OXYGEN SATURATION: 99 % | TEMPERATURE: 97.8 F | SYSTOLIC BLOOD PRESSURE: 128 MMHG | HEIGHT: 70 IN | WEIGHT: 190.2 LBS | BODY MASS INDEX: 27.23 KG/M2 | HEART RATE: 60 BPM

## 2022-04-01 DIAGNOSIS — Z99.89 OSA ON CPAP: Primary | ICD-10-CM

## 2022-04-01 DIAGNOSIS — J30.2 SEASONAL ALLERGIC RHINITIS, UNSPECIFIED TRIGGER: ICD-10-CM

## 2022-04-01 DIAGNOSIS — G47.33 OSA ON CPAP: Primary | ICD-10-CM

## 2022-04-01 PROCEDURE — 99213 OFFICE O/P EST LOW 20 MIN: CPT | Performed by: PHYSICIAN ASSISTANT

## 2022-04-01 RX ORDER — MONTELUKAST SODIUM 10 MG/1
10 TABLET ORAL NIGHTLY
Qty: 30 TABLET | Refills: 5 | Status: SHIPPED | OUTPATIENT
Start: 2022-04-01

## 2022-04-01 ASSESSMENT — ENCOUNTER SYMPTOMS
WHEEZING: 0
ALLERGIC/IMMUNOLOGIC NEGATIVE: 1
DIARRHEA: 0
NAUSEA: 0
EYES NEGATIVE: 1
SHORTNESS OF BREATH: 0
COUGH: 0
STRIDOR: 0
BACK PAIN: 0
CHEST TIGHTNESS: 0

## 2022-04-01 NOTE — PROGRESS NOTES
[place] [facility]      Evert Birch         [id number]  4/1/2022   Chief Complaint   Patient presents with    6 Month Follow-Up     BHARATI with download, EPR added        Pt of Dr. Mary NÚÑEZ Download:   Original or initial AHI: 7.8     Date of initial study: 8/16/2017      Compliant  87%     Noncompliant 7 %     PAP Type Airsense 10 autoset Level  11   Avg Hrs/Day 6 hr 55 min  AHI: 1.5   Recorded compliance dates , 3/1/2022  to 3/30/2022   Machine/Mfg:   [x] ResMed    [] Respironics/Dreamstation   Interface:   [] Nasal    [] Nasal pillows   [x] FFM      Provider:      [] SR-HME     [][employer]     [] [employer]co    [] Peggy Loose    [x] [facility]               [] [employer]      [] Adaptive    [] [address]      [] Other    Neck Size: 15.25  Mallampati Mallampati 2  ESS:  2  SAQLI: 92    Here is a scan of the most recent download:          Presentation:   Bernie Rooney presents for sleep medicine follow up for obstructive sleep apnea  Since the last visit, Bernie Rooney is doing well with PAP. He is sleeping better since getting a new bed. He gets good benefit with PAP. He has been more fatigued since Covid but slowly improving. He uses Singulair for allergies and needs refill. Equipment issues: The pressure is  acceptable, the mask is acceptable     Sleep issues:  Do you feel better? yes  More rested? Yes   Better concentration? yes    Progress History:   Since last visit any new medical issues? Yes Covid  New ER or hospital visits? No  Any new or changes in medicines? No  Any new sleep medicines? No    Review of Systems -   Review of Systems   Constitutional: Negative for activity change, appetite change, chills and fever. HENT: Negative for congestion and postnasal drip. Eyes: Negative. Respiratory: Negative for cough, chest tightness, shortness of breath, wheezing and stridor. Cardiovascular: Negative for chest pain and leg swelling.    Gastrointestinal: Negative for diarrhea and nausea. Endocrine: Negative. Genitourinary: Negative. Musculoskeletal: Negative. Negative for arthralgias and back pain. Skin: Negative. Allergic/Immunologic: Negative. Neurological: Negative. Negative for dizziness and light-headedness. Psychiatric/Behavioral: Negative. All other systems reviewed and are negative. Physical Exam:    BMI:  Body mass index is 27.29 kg/m². Wt Readings from Last 3 Encounters:   04/01/22 190 lb 3.2 oz (86.3 kg)   11/16/21 180 lb (81.6 kg)   12/04/20 180 lb 9.6 oz (81.9 kg)     Weight gained 10 lbs over 5 months  Vitals: /84 (Site: Left Upper Arm, Position: Sitting, Cuff Size: Large Adult)   Pulse 60   Temp 97.8 °F (36.6 °C) (Temporal)   Ht 5' 10\" (1.778 m)   Wt 190 lb 3.2 oz (86.3 kg)   SpO2 99%   BMI 27.29 kg/m²       Physical Exam  Constitutional:       Appearance: He is well-developed. HENT:      Head: Normocephalic and atraumatic. Right Ear: External ear normal.      Left Ear: External ear normal.   Eyes:      Conjunctiva/sclera: Conjunctivae normal.      Pupils: Pupils are equal, round, and reactive to light. Cardiovascular:      Rate and Rhythm: Normal rate and regular rhythm. Heart sounds: Normal heart sounds. Pulmonary:      Effort: Pulmonary effort is normal.      Breath sounds: Normal breath sounds. Musculoskeletal:         General: Normal range of motion. Cervical back: Normal range of motion and neck supple. Skin:     General: Skin is warm and dry. Neurological:      Mental Status: He is alert and oriented to person, place, and time. Psychiatric:         Behavior: Behavior normal.         Thought Content: Thought content normal.         Judgment: Judgment normal.           ASSESSMENT/DIAGNOSIS     Diagnosis Orders   1. BHARATI on CPAP  DME Order for CPAP as OP   2. Seasonal allergic rhinitis, unspecified trigger            Plan   Do you need any equipment today?  Yes update supplies  - renew Singulair   - Download reviewed and discussed with patient  - He  was advised to continue current positive airway pressure therapy with above described pressure. - He  advised to keep good compliance with current recommended pressure to get optimal results and clinical improvement  - Recommend 7-9 hours of sleep with PAP  - He was advised to call [employer] regarding supplies if needed.   -He call my office for earlier appointment if needed for worsening of sleep symptoms.   - He was instructed on weight loss  - Florecita Bill was educated about my impression and plan. Patient verbalizesunderstanding.   We will see Dannielle Fleischer back in: 1 year with download    Information added by my medical assistant/LPN was reviewed today         Sj Mayes PA-C, MPAS  4/1/2022

## 2022-08-01 ENCOUNTER — HOSPITAL ENCOUNTER (EMERGENCY)
Age: 47
Discharge: HOME | End: 2022-08-01
Payer: COMMERCIAL

## 2022-08-01 VITALS
BODY MASS INDEX: 26.48 KG/M2 | HEIGHT: 70 IN | DIASTOLIC BLOOD PRESSURE: 73 MMHG | OXYGEN SATURATION: 99 % | SYSTOLIC BLOOD PRESSURE: 130 MMHG | WEIGHT: 185 LBS | RESPIRATION RATE: 20 BRPM | HEART RATE: 76 BPM | TEMPERATURE: 98.5 F

## 2022-08-01 DIAGNOSIS — U07.1 COVID-19: Primary | ICD-10-CM

## 2022-08-01 LAB — SARS-COV-2, NAA: DETECTED

## 2022-08-01 PROCEDURE — 99214 OFFICE O/P EST MOD 30 MIN: CPT

## 2022-08-01 PROCEDURE — 6370000000 HC RX 637 (ALT 250 FOR IP): Performed by: NURSE PRACTITIONER

## 2022-08-01 PROCEDURE — 99213 OFFICE O/P EST LOW 20 MIN: CPT | Performed by: NURSE PRACTITIONER

## 2022-08-01 PROCEDURE — 87635 SARS-COV-2 COVID-19 AMP PRB: CPT

## 2022-08-01 RX ORDER — IBUPROFEN 800 MG/1
800 TABLET ORAL ONCE
Status: COMPLETED | OUTPATIENT
Start: 2022-08-01 | End: 2022-08-01

## 2022-08-01 RX ORDER — ONDANSETRON 4 MG/1
4 TABLET, ORALLY DISINTEGRATING ORAL EVERY 8 HOURS PRN
Qty: 30 TABLET | Refills: 0 | Status: SHIPPED | OUTPATIENT
Start: 2022-08-01

## 2022-08-01 RX ORDER — IBUPROFEN 200 MG
200 TABLET ORAL EVERY 6 HOURS PRN
COMMUNITY

## 2022-08-01 RX ORDER — PREDNISONE 20 MG/1
20 TABLET ORAL 2 TIMES DAILY
Qty: 10 TABLET | Refills: 0 | Status: SHIPPED | OUTPATIENT
Start: 2022-08-01 | End: 2022-08-06

## 2022-08-01 RX ADMIN — IBUPROFEN 800 MG: 800 TABLET, FILM COATED ORAL at 13:51

## 2022-08-01 ASSESSMENT — PAIN DESCRIPTION - DESCRIPTORS: DESCRIPTORS: ACHING

## 2022-08-01 ASSESSMENT — PAIN DESCRIPTION - PAIN TYPE: TYPE: ACUTE PAIN

## 2022-08-01 ASSESSMENT — PAIN SCALES - GENERAL
PAINLEVEL_OUTOF10: 10
PAINLEVEL_OUTOF10: 10

## 2022-08-01 ASSESSMENT — PAIN DESCRIPTION - FREQUENCY: FREQUENCY: CONTINUOUS

## 2022-08-01 ASSESSMENT — PAIN - FUNCTIONAL ASSESSMENT
PAIN_FUNCTIONAL_ASSESSMENT: PREVENTS OR INTERFERES WITH MANY ACTIVE NOT PASSIVE ACTIVITIES
PAIN_FUNCTIONAL_ASSESSMENT: 0-10

## 2022-08-01 ASSESSMENT — ENCOUNTER SYMPTOMS
SORE THROAT: 1
COUGH: 1
SHORTNESS OF BREATH: 0
VOMITING: 0
DIARRHEA: 0
RHINORRHEA: 1
NAUSEA: 0
CHEST TIGHTNESS: 0

## 2022-08-01 ASSESSMENT — PAIN DESCRIPTION - ONSET: ONSET: GRADUAL

## 2022-08-01 NOTE — ED TRIAGE NOTES
Pt co rapid onset of bodyaches, chills, headache, sore throat, and cough. Pt co not being able to get comfortable due to the bodyaches.

## 2022-08-01 NOTE — ED PROVIDER NOTES
[address]  Urgent Care Encounter       CHIEF COMPLAINT       Chief Complaint   Patient presents with    Pharyngitis    Cough    Headache    Fatigue       Nurses Notes reviewed and I agree except as noted in the HPI. HISTORY OF PRESENT ILLNESS   Ana Hazel is a 55 y.o. male who presents to the [facility] ambulatory care center for evaluation of generalized body aches. He reports that the symptoms started yesterday. He denies known exposure to someone ill. He does report his associated symptoms as nasal congestion, rhinorrhea, postnasal drainage, sore throat, headache, chills, body aches, and cough. The history is provided by the patient. No  was used. REVIEW OF SYSTEMS     Review of Systems   Constitutional:  Positive for fatigue. Negative for activity change, appetite change, chills and fever. HENT:  Positive for congestion, postnasal drip, rhinorrhea and sore throat. Negative for ear discharge and ear pain. Respiratory:  Positive for cough. Negative for chest tightness and shortness of breath. Cardiovascular:  Negative for chest pain. Gastrointestinal:  Negative for diarrhea, nausea and vomiting. Genitourinary:  Negative for dysuria. Musculoskeletal:  Positive for arthralgias. Skin:  Negative for rash. Allergic/Immunologic: Negative for environmental allergies and food allergies. Neurological:  Negative for dizziness and headaches. PAST MEDICAL HISTORY         Diagnosis Date    Kidney stone     Sleep apnea        SURGICALHISTORY     Patient  has a past surgical history that includes Lithotripsy (2012); Cholecystectomy; and Vasectomy (2003).     CURRENT MEDICATIONS       Discharge Medication List as of 8/1/2022  1:34 PM        CONTINUE these medications which have NOT CHANGED    Details   ibuprofen (ADVIL;MOTRIN) 200 MG tablet Take 200 mg by mouth every 6 hours as needed for PainHistorical Med      montelukast (SINGULAIR) 10 MG tablet Take 1 tablet by mouth nightly, Disp-30 tablet, R-5Normal      acetaminophen (TYLENOL) 500 MG tablet Take 500 mg by mouth every 6 hours as needed for PainHistorical Med      CPAP Machine MISC Starting Fri 12/4/2020, Disp-1 each, R-0, PrintPlease change CPAP pressure to 11 cm H20. Add EPR 3      NONFORMULARY Indications: Juice PLusHistorical Med             ALLERGIES     Patient is is allergic to floxin [ofloxacin] and ultram [tramadol hcl]. Patients   Immunization History   Administered Date(s) Administered    Tdap (Boostrix, Adacel) 05/07/2020       FAMILY HISTORY     Patient's family history includes Other in his father. SOCIAL HISTORY     Patient  reports that he has never smoked. He has never used smokeless tobacco. He reports that he does not drink alcohol and does not use drugs. PHYSICAL EXAM     ED TRIAGE VITALS  BP: 130/73, Temp: 98.5 °F (36.9 °C), Heart Rate: 76, Resp: 20, SpO2: 99 %,Estimated body mass index is 26.54 kg/m² as calculated from the following:    Height as of this encounter: 5' 10\" (1.778 m). Weight as of this encounter: 185 lb (83.9 kg). ,No LMP for male patient. Physical Exam  Vitals and nursing note reviewed. Constitutional:       General: He is not in acute distress. Appearance: Normal appearance. He is not ill-appearing, toxic-appearing or diaphoretic. HENT:      Head: Normocephalic. Right Ear: Ear canal and external ear normal.      Left Ear: Ear canal and external ear normal.      Nose: Nose normal. No congestion or rhinorrhea. Mouth/Throat:      Mouth: Mucous membranes are moist.      Pharynx: Oropharynx is clear. No oropharyngeal exudate or posterior oropharyngeal erythema. Cardiovascular:      Rate and Rhythm: Normal rate. Pulses: Normal pulses. Pulmonary:      Effort: Pulmonary effort is normal. No respiratory distress. Breath sounds: No stridor. No wheezing or rhonchi. Abdominal:      General: Abdomen is flat.  Bowel sounds are normal.      Palpations: Abdomen is soft. Musculoskeletal:         General: No swelling or tenderness. Normal range of motion. Cervical back: Normal range of motion. Neurological:      General: No focal deficit present. Mental Status: He is alert and oriented to person, place, and time. Psychiatric:         Mood and Affect: Mood normal.         Behavior: Behavior normal.       DIAGNOSTIC RESULTS     Labs:  Results for orders placed or performed during the hospital encounter of 08/01/22   COVID-19, Rapid   Result Value Ref Range    SARS-CoV-2, LUIS DANIEL DETECTED (AA) NOT DETECTED       IMAGING:    No orders to display         EKG: None      URGENT CARE COURSE:     Vitals:    08/01/22 1328   BP: 130/73   Pulse: 76   Resp: 20   Temp: 98.5 °F (36.9 °C)   TempSrc: Temporal   SpO2: 99%   Weight: 185 lb (83.9 kg)   Height: 5' 10\" (1.778 m)       Medications   ibuprofen (ADVIL;MOTRIN) tablet 800 mg (800 mg Oral Given 8/1/22 1351)            PROCEDURES:  None    FINAL IMPRESSION      1. COVID-19          DISPOSITION/ PLAN     Patient seen and evaluated for the above symptoms. A rapid Covid swab was obtained and positive. He is instructed to isolate for 5 days from symptom onset. He is instructed to return to work on day 6 if symptoms are manageable and he is afebrile. He is instructed to wear a face covering for the next 5 days. He is provided a work note for 2 days with the current CDC recommendations for isolation. He is instructed to use over-the-counter Tylenol and Motrin for pain or fever. He is instructed to use zinc, vitamin C, vitamin D3, and Pepcid. He is instructed to use Mucinex D, Flonase, and Zyrtec for symptom relief. He is instructed to follow-up with his PCP in 3 to 5 days with new or worsening symptoms. He is agreeable with the above plan and denies questions or concerns at this time.         PATIENT REFERRED TO:  Rufina Menjivar MD  [address] / [place] [address]      DISCHARGE MEDICATIONS:  Discharge Medication List as of 8/1/2022  1:34 PM          Discharge Medication List as of 8/1/2022  1:34 PM        STOP taking these medications       albuterol sulfate  (90 Base) MCG/ACT inhaler Comments:   Reason for Stopping:         azithromycin (ZITHROMAX Z-PETER) 250 MG tablet Comments:   Reason for Stopping:               Discharge Medication List as of 8/1/2022  1:34 PM          Reji Aquas, APRN - CNP    (Please note that portions of this note were completed with a voice recognition program. Efforts were made to edit the dictations but occasionally words are mis-transcribed.)           MERLYN Melchor CNP  08/01/22 3318

## 2022-08-01 NOTE — Clinical Note
Aurea Chakraborty was seen and treated in our emergency department on 8/1/2022. He may return to work on 08/03/2022. May be off school or work one to two days if needed. If you have any questions or concerns, please don't hesitate to call.       Nicole Gardner, APRN - CNP

## 2023-04-06 NOTE — PROGRESS NOTES
[facility]      Yuliana Hu         [id number]  4/7/2023   Chief Complaint   Patient presents with    Follow-up     1 year sarah        Pt of Dr. David Bhakta     PAP Download:   Brian Lemus or initial AHI: 7.8     Date of initial study: 8/16/2017      Compliant  17%     Noncompliant 30 %     PAP Type CPAP Level  11   Avg Hrs/Day 3.5  AHI: 2.5   Recorded compliance dates 3/5/23-4/3/23  Machine/Mfg:   [x] ResMed    [] Respironics/Dreamstation   Interface:   [] Nasal    [] Nasal pillows   [x] FFM      Provider:      [] SR-HME     [][employer]     [] [employer]    [] Shawna Dynes    [x] [facility]               [] [employer]      [] Adaptive    [] [address]      [] Other    Neck Size: 15.5  Mallampati 2  ESS:  2  SAQLI: 91    Here is a scan of the most recent download:            Presentation:   Nia George presents for sleep medicine follow up for obstructive sleep apnea  Since the last visit, Nia George is not tolerating PAP secondary to having neck pain and nerve pain. He is not sleeping well secondary to nerve pain. He is sleeping about 2-3 hours at night. Very restless. He is working with chiropractor and starting to get some benefit. He is more tired without PAP currently. He is taking Singulair for allergies in spring and fall. Equipment issues: The pressure is  acceptable, the mask is acceptable     Sleep issues:  Do you feel better? No  More rested? Sometimes   Better concentration? no    Progress History:   Since last visit any new medical issues? Yes neck pain  New ER or hospital visits? No  Any new or changes in medicines? No  Any new sleep medicines? No    Review of Systems -   Review of Systems   Constitutional:  Negative for activity change, appetite change, chills and fever. HENT:  Negative for congestion and postnasal drip. Eyes: Negative. Respiratory:  Negative for cough, chest tightness, shortness of breath, wheezing and stridor.     Cardiovascular:  Negative

## 2023-04-07 ENCOUNTER — OFFICE VISIT (OUTPATIENT)
Dept: PULMONOLOGY | Age: 48
End: 2023-04-07
Payer: COMMERCIAL

## 2023-04-07 VITALS
HEART RATE: 91 BPM | SYSTOLIC BLOOD PRESSURE: 126 MMHG | WEIGHT: 199 LBS | OXYGEN SATURATION: 97 % | TEMPERATURE: 98.1 F | DIASTOLIC BLOOD PRESSURE: 72 MMHG | HEIGHT: 70 IN | BODY MASS INDEX: 28.49 KG/M2

## 2023-04-07 DIAGNOSIS — G47.33 OSA ON CPAP: Primary | ICD-10-CM

## 2023-04-07 DIAGNOSIS — Z99.89 OSA ON CPAP: Primary | ICD-10-CM

## 2023-04-07 DIAGNOSIS — M54.2 NECK PAIN: ICD-10-CM

## 2023-04-07 DIAGNOSIS — G47.10 HYPERSOMNIA: ICD-10-CM

## 2023-04-07 PROCEDURE — 99214 OFFICE O/P EST MOD 30 MIN: CPT | Performed by: PHYSICIAN ASSISTANT

## 2023-04-07 RX ORDER — MONTELUKAST SODIUM 10 MG/1
10 TABLET ORAL NIGHTLY
Qty: 30 TABLET | Refills: 5 | Status: SHIPPED | OUTPATIENT
Start: 2023-04-07

## 2023-04-07 ASSESSMENT — ENCOUNTER SYMPTOMS
ALLERGIC/IMMUNOLOGIC NEGATIVE: 1
EYES NEGATIVE: 1
SHORTNESS OF BREATH: 0
WHEEZING: 0
COUGH: 0
STRIDOR: 0
DIARRHEA: 0
NAUSEA: 0
BACK PAIN: 0
CHEST TIGHTNESS: 0

## 2023-09-15 ENCOUNTER — OFFICE VISIT (OUTPATIENT)
Dept: PULMONOLOGY | Age: 48
End: 2023-09-15
Payer: COMMERCIAL

## 2023-09-15 VITALS
WEIGHT: 199.4 LBS | TEMPERATURE: 98 F | BODY MASS INDEX: 28.55 KG/M2 | SYSTOLIC BLOOD PRESSURE: 110 MMHG | DIASTOLIC BLOOD PRESSURE: 64 MMHG | HEIGHT: 70 IN | HEART RATE: 64 BPM | OXYGEN SATURATION: 95 %

## 2023-09-15 DIAGNOSIS — G47.33 OSA ON CPAP: Primary | ICD-10-CM

## 2023-09-15 DIAGNOSIS — Z99.89 OSA ON CPAP: Primary | ICD-10-CM

## 2023-09-15 DIAGNOSIS — G47.10 HYPERSOMNIA: ICD-10-CM

## 2023-09-15 PROCEDURE — 99213 OFFICE O/P EST LOW 20 MIN: CPT | Performed by: PHYSICIAN ASSISTANT

## 2023-09-15 ASSESSMENT — ENCOUNTER SYMPTOMS
BACK PAIN: 0
COUGH: 0
EYES NEGATIVE: 1
NAUSEA: 0
ALLERGIC/IMMUNOLOGIC NEGATIVE: 1
DIARRHEA: 0
WHEEZING: 0
STRIDOR: 0
SHORTNESS OF BREATH: 0
CHEST TIGHTNESS: 0

## 2024-08-15 NOTE — PROGRESS NOTES
[facility]      Bandar Landeros         [id number]  8/16/2024   Chief Complaint   Patient presents with    Follow-up     1yr BHARATI f/u w/[facility] download.  Doing well.  Needs script for PAP supplies. Notes he has been having back issues for the past year so he is not always able to go to bed.        Pt of Dr. Jamil     PAP Download:   Original or initial AHI: 7.8     Date of initial study: 8/16/2017        Compliant  80%     Noncompliant 20 %     PAP Type CPAP  Level  12 cmH2O   Avg Hrs/Day 7hrs 2mins  AHI: 1.8   Recorded compliance dates , 7/13/24  to 8/11/24   Machine/Mfg:   [x] ResMed    [] Respironics/Dreamstation   Interface:   [] Nasal    [] Nasal pillows   [x] FFM      Provider:      [] SR-HME     [][facility]     [] [employer]    [] [employer]    [] [facility]               [] [employer]      [] Adaptive    [x] [facility]      [] Other    Neck Size: 16 inches  Mallampati 2  ESS:  2  SAQLI: 92    Here is a scan of the most recent download:            Presentation:   Bandar presents for sleep medicine follow up for obstructive sleep apnea  Since the last visit, Bandar is doing well with PAP.  He states back pain limits his sleep at times and then will sleep in recliner.  He states back pain is improving with PT.  He is starting to sleep better.  He feels more rested the days he wears PAP. More tired when not wearing PAP.    Equipment issues:  The pressure is  acceptable, the mask is acceptable     Sleep issues:  Do you feel better? Yes  More rested?Yes   Better concentration? yes    Progress History:   Since last visit any new medical issues? No  New ER or hospital visits? No      Review of Systems -   Review of Systems   Constitutional:  Negative for activity change, appetite change, chills and fever.   HENT:  Negative for congestion and postnasal drip.    Eyes: Negative.    Respiratory:  Negative for cough, chest tightness, shortness

## 2024-08-16 ENCOUNTER — OFFICE VISIT (OUTPATIENT)
Dept: PULMONOLOGY | Age: 49
End: 2024-08-16
Payer: COMMERCIAL

## 2024-08-16 VITALS
OXYGEN SATURATION: 96 % | HEART RATE: 66 BPM | BODY MASS INDEX: 27.75 KG/M2 | DIASTOLIC BLOOD PRESSURE: 74 MMHG | WEIGHT: 193.8 LBS | HEIGHT: 70 IN | TEMPERATURE: 98.1 F | SYSTOLIC BLOOD PRESSURE: 106 MMHG

## 2024-08-16 DIAGNOSIS — G47.33 OSA ON CPAP: Primary | ICD-10-CM

## 2024-08-16 DIAGNOSIS — G47.10 HYPERSOMNIA: ICD-10-CM

## 2024-08-16 PROCEDURE — 99213 OFFICE O/P EST LOW 20 MIN: CPT | Performed by: PHYSICIAN ASSISTANT

## 2024-08-16 ASSESSMENT — ENCOUNTER SYMPTOMS
ALLERGIC/IMMUNOLOGIC NEGATIVE: 1
NAUSEA: 0
COUGH: 0
CHEST TIGHTNESS: 0
BACK PAIN: 0
SHORTNESS OF BREATH: 0
EYES NEGATIVE: 1
STRIDOR: 0
WHEEZING: 0
DIARRHEA: 0

## 2025-08-15 ENCOUNTER — OFFICE VISIT (OUTPATIENT)
Dept: PULMONOLOGY | Age: 50
End: 2025-08-15
Payer: COMMERCIAL

## 2025-08-15 VITALS
DIASTOLIC BLOOD PRESSURE: 74 MMHG | SYSTOLIC BLOOD PRESSURE: 136 MMHG | WEIGHT: 201 LBS | HEIGHT: 70 IN | OXYGEN SATURATION: 97 % | HEART RATE: 65 BPM | TEMPERATURE: 97.7 F | BODY MASS INDEX: 28.77 KG/M2

## 2025-08-15 DIAGNOSIS — G47.33 OSA ON CPAP: Primary | ICD-10-CM

## 2025-08-15 DIAGNOSIS — R53.83 FATIGUE, UNSPECIFIED TYPE: ICD-10-CM

## 2025-08-15 DIAGNOSIS — Z91.09 ENVIRONMENTAL ALLERGIES: ICD-10-CM

## 2025-08-15 PROBLEM — M70.032: Status: ACTIVE | Noted: 2024-08-06

## 2025-08-15 PROBLEM — Z82.49 FAMILY HISTORY OF EARLY CAD: Status: ACTIVE | Noted: 2024-08-06

## 2025-08-15 PROCEDURE — 99214 OFFICE O/P EST MOD 30 MIN: CPT | Performed by: NURSE PRACTITIONER

## 2025-08-15 RX ORDER — MONTELUKAST SODIUM 10 MG/1
10 TABLET ORAL NIGHTLY
Qty: 30 TABLET | Refills: 5 | Status: SHIPPED | OUTPATIENT
Start: 2025-08-15

## 2025-08-29 ENCOUNTER — TELEPHONE (OUTPATIENT)
Age: 50
End: 2025-08-29